# Patient Record
Sex: FEMALE | Race: WHITE
[De-identification: names, ages, dates, MRNs, and addresses within clinical notes are randomized per-mention and may not be internally consistent; named-entity substitution may affect disease eponyms.]

---

## 2020-03-30 ENCOUNTER — HOSPITAL ENCOUNTER (INPATIENT)
Dept: HOSPITAL 95 - ER | Age: 55
LOS: 2 days | Discharge: HOME | DRG: 897 | End: 2020-04-01
Attending: HOSPITALIST | Admitting: HOSPITALIST
Payer: COMMERCIAL

## 2020-03-30 VITALS — HEIGHT: 65.98 IN | BODY MASS INDEX: 30.51 KG/M2 | WEIGHT: 189.82 LBS

## 2020-03-30 DIAGNOSIS — E87.6: ICD-10-CM

## 2020-03-30 DIAGNOSIS — N17.9: ICD-10-CM

## 2020-03-30 DIAGNOSIS — F32.9: ICD-10-CM

## 2020-03-30 DIAGNOSIS — F10.239: Primary | ICD-10-CM

## 2020-03-30 DIAGNOSIS — E78.5: ICD-10-CM

## 2020-03-30 DIAGNOSIS — I10: ICD-10-CM

## 2020-03-30 DIAGNOSIS — E86.0: ICD-10-CM

## 2020-03-30 DIAGNOSIS — E87.2: ICD-10-CM

## 2020-03-30 DIAGNOSIS — Z87.891: ICD-10-CM

## 2020-03-30 LAB
ALBUMIN SERPL BCP-MCNC: 4 G/DL (ref 3.4–5)
ALBUMIN SERPL BCP-MCNC: 4.7 G/DL (ref 3.4–5)
ALBUMIN/GLOB SERPL: 1.1 {RATIO} (ref 0.8–1.8)
ALBUMIN/GLOB SERPL: 1.2 {RATIO} (ref 0.8–1.8)
ALT SERPL W P-5'-P-CCNC: 82 U/L (ref 12–78)
ALT SERPL W P-5'-P-CCNC: 91 U/L (ref 12–78)
ANION GAP SERPL CALCULATED.4IONS-SCNC: 20 MMOL/L (ref 6–16)
ANION GAP SERPL CALCULATED.4IONS-SCNC: 8 MMOL/L (ref 6–16)
APAP SERPL-MCNC: <2 UG/ML (ref 10–30)
AST SERPL W P-5'-P-CCNC: 131 U/L (ref 12–37)
AST SERPL W P-5'-P-CCNC: 158 U/L (ref 12–37)
BASOPHILS # BLD AUTO: 0.05 K/MM3 (ref 0–0.23)
BASOPHILS NFR BLD AUTO: 1 % (ref 0–2)
BENZODIAZ UR-MCNC: DETECTED UG/L
BILIRUB SERPL-MCNC: 0.7 MG/DL (ref 0.1–1)
BILIRUB SERPL-MCNC: 0.8 MG/DL (ref 0.1–1)
BILIRUB UR QL STRIP: (no result)
BUN SERPL-MCNC: 43 MG/DL (ref 8–24)
BUN SERPL-MCNC: 44 MG/DL (ref 8–24)
CALCIUM SERPL-MCNC: 10 MG/DL (ref 8.5–10.1)
CALCIUM SERPL-MCNC: 9.3 MG/DL (ref 8.5–10.1)
CHLORIDE SERPL-SCNC: 101 MMOL/L (ref 98–108)
CHLORIDE SERPL-SCNC: 98 MMOL/L (ref 98–108)
CO2 SERPL-SCNC: 19 MMOL/L (ref 21–32)
CO2 SERPL-SCNC: 27 MMOL/L (ref 21–32)
CREAT SERPL-MCNC: 1.05 MG/DL (ref 0.4–1)
CREAT SERPL-MCNC: 1.37 MG/DL (ref 0.4–1)
DEPRECATED RDW RBC AUTO: 51.6 FL (ref 35.1–46.3)
DEPRECATED RDW RBC AUTO: 53.2 FL (ref 35.1–46.3)
EOSINOPHIL # BLD AUTO: 0.06 K/MM3 (ref 0–0.68)
EOSINOPHIL NFR BLD AUTO: 1 % (ref 0–6)
ERYTHROCYTE [DISTWIDTH] IN BLOOD BY AUTOMATED COUNT: 13.2 % (ref 11.7–14.2)
ERYTHROCYTE [DISTWIDTH] IN BLOOD BY AUTOMATED COUNT: 13.5 % (ref 11.7–14.2)
ETHANOL SERPL-MCNC: <3 MG/DL
GLOBULIN SER CALC-MCNC: 3.5 G/DL (ref 2.2–4)
GLOBULIN SER CALC-MCNC: 4 G/DL (ref 2.2–4)
GLUCOSE SERPL-MCNC: 122 MG/DL (ref 70–99)
GLUCOSE SERPL-MCNC: 146 MG/DL (ref 70–99)
HCT VFR BLD AUTO: 32.8 % (ref 33–51)
HCT VFR BLD AUTO: 38.9 % (ref 33–51)
HGB BLD-MCNC: 11.1 G/DL (ref 11.5–16)
HGB BLD-MCNC: 12.8 G/DL (ref 11.5–16)
IMM GRANULOCYTES # BLD AUTO: 0.05 K/MM3 (ref 0–0.1)
IMM GRANULOCYTES NFR BLD AUTO: 1 % (ref 0–1)
KETONES UR STRIP-MCNC: (no result) MG/DL
LEUKOCYTE ESTERASE UR QL STRIP: (no result)
LYMPHOCYTES # BLD AUTO: 2.46 K/MM3 (ref 0.84–5.2)
LYMPHOCYTES NFR BLD AUTO: 28 % (ref 21–46)
MAGNESIUM SERPL-MCNC: 1.6 MG/DL (ref 1.6–2.4)
MCHC RBC AUTO-ENTMCNC: 32.9 G/DL (ref 31.5–36.5)
MCHC RBC AUTO-ENTMCNC: 33.8 G/DL (ref 31.5–36.5)
MCV RBC AUTO: 105 FL (ref 80–100)
MCV RBC AUTO: 108 FL (ref 80–100)
MONOCYTES # BLD AUTO: 0.81 K/MM3 (ref 0.16–1.47)
MONOCYTES NFR BLD AUTO: 9 % (ref 4–13)
NEUTROPHILS # BLD AUTO: 5.37 K/MM3 (ref 1.96–9.15)
NEUTROPHILS NFR BLD AUTO: 61 % (ref 41–73)
NRBC # BLD AUTO: 0 K/MM3 (ref 0–0.02)
NRBC # BLD AUTO: 0.02 K/MM3 (ref 0–0.02)
NRBC BLD AUTO-RTO: 0 /100 WBC (ref 0–0.2)
NRBC BLD AUTO-RTO: 0.2 /100 WBC (ref 0–0.2)
OSMOLALITY SERPL: 306 MOS/KG (ref 275–300)
PHOSPHATE SERPL-MCNC: 2.4 MG/DL (ref 2.5–4.9)
PLATELET # BLD AUTO: 118 K/MM3 (ref 150–400)
PLATELET # BLD AUTO: 172 K/MM3 (ref 150–400)
POTASSIUM SERPL-SCNC: 3.2 MMOL/L (ref 3.5–5.5)
POTASSIUM SERPL-SCNC: 3.7 MMOL/L (ref 3.5–5.5)
PROT SERPL-MCNC: 7.5 G/DL (ref 6.4–8.2)
PROT SERPL-MCNC: 8.7 G/DL (ref 6.4–8.2)
PROT UR STRIP-MCNC: (no result) MG/DL
RBC #/AREA URNS HPF: (no result) /HPF (ref 0–2)
SALICYLATES SERPL-MCNC: <1.7 MG/DL (ref 2.8–20)
SODIUM SERPL-SCNC: 136 MMOL/L (ref 136–145)
SODIUM SERPL-SCNC: 137 MMOL/L (ref 136–145)
SP GR SPEC: 1.02 (ref 1–1.02)
UROBILINOGEN UR STRIP-MCNC: (no result) MG/DL
WBC #/AREA URNS HPF: (no result) /HPF (ref 0–5)

## 2020-03-30 PROCEDURE — P9612 CATHETERIZE FOR URINE SPEC: HCPCS

## 2020-03-30 PROCEDURE — G0480 DRUG TEST DEF 1-7 CLASSES: HCPCS

## 2020-04-01 LAB
ANION GAP SERPL CALCULATED.4IONS-SCNC: 6 MMOL/L (ref 6–16)
BUN SERPL-MCNC: 27 MG/DL (ref 8–24)
CALCIUM SERPL-MCNC: 8.6 MG/DL (ref 8.5–10.1)
CHLORIDE SERPL-SCNC: 101 MMOL/L (ref 98–108)
CO2 SERPL-SCNC: 29 MMOL/L (ref 21–32)
CREAT SERPL-MCNC: 0.88 MG/DL (ref 0.4–1)
GLUCOSE SERPL-MCNC: 99 MG/DL (ref 70–99)
POTASSIUM SERPL-SCNC: 3.6 MMOL/L (ref 3.5–5.5)
SODIUM SERPL-SCNC: 136 MMOL/L (ref 136–145)

## 2021-09-13 ENCOUNTER — HOSPITAL ENCOUNTER (EMERGENCY)
Dept: HOSPITAL 95 - ER | Age: 56
Discharge: HOME | End: 2021-09-13
Payer: COMMERCIAL

## 2021-09-13 VITALS — WEIGHT: 163.01 LBS | BODY MASS INDEX: 26.2 KG/M2 | HEIGHT: 66 IN

## 2021-09-13 DIAGNOSIS — E83.42: ICD-10-CM
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
## 2021-09-13 DIAGNOSIS — F10.129: Primary | ICD-10-CM

## 2021-09-13 DIAGNOSIS — E87.6: ICD-10-CM

## 2021-09-13 DIAGNOSIS — D64.9: ICD-10-CM

## 2021-09-13 DIAGNOSIS — I10: ICD-10-CM

## 2021-09-13 DIAGNOSIS — Z79.899: ICD-10-CM

## 2021-09-13 LAB
ALBUMIN SERPL BCP-MCNC: 3.9 G/DL (ref 3.4–5)
ALBUMIN/GLOB SERPL: 1.1 {RATIO} (ref 0.8–1.8)
ALT SERPL W P-5'-P-CCNC: 119 U/L (ref 12–78)
ANION GAP SERPL CALCULATED.4IONS-SCNC: 9 MMOL/L (ref 6–16)
AST SERPL W P-5'-P-CCNC: 241 U/L (ref 12–37)
BASOPHILS # BLD AUTO: 0.03 K/MM3 (ref 0–0.23)
BASOPHILS NFR BLD AUTO: 1 % (ref 0–2)
BILIRUB SERPL-MCNC: 0.6 MG/DL (ref 0.1–1)
BUN SERPL-MCNC: 14 MG/DL (ref 8–24)
CALCIUM SERPL-MCNC: 9 MG/DL (ref 8.5–10.1)
CHLORIDE SERPL-SCNC: 102 MMOL/L (ref 98–108)
CO2 SERPL-SCNC: 28 MMOL/L (ref 21–32)
CREAT SERPL-MCNC: 0.66 MG/DL (ref 0.4–1)
DEPRECATED RDW RBC AUTO: 57.8 FL (ref 35.1–46.3)
EOSINOPHIL # BLD AUTO: 0.08 K/MM3 (ref 0–0.68)
EOSINOPHIL NFR BLD AUTO: 2 % (ref 0–6)
ERYTHROCYTE [DISTWIDTH] IN BLOOD BY AUTOMATED COUNT: 14.3 % (ref 11.7–14.2)
GLOBULIN SER CALC-MCNC: 3.7 G/DL (ref 2.2–4)
GLUCOSE SERPL-MCNC: 91 MG/DL (ref 70–99)
HCT VFR BLD AUTO: 28.9 % (ref 33–51)
HGB BLD-MCNC: 10.3 G/DL (ref 11.5–16)
IMM GRANULOCYTES # BLD AUTO: 0.02 K/MM3 (ref 0–0.1)
IMM GRANULOCYTES NFR BLD AUTO: 1 % (ref 0–1)
LYMPHOCYTES # BLD AUTO: 1.17 K/MM3 (ref 0.84–5.2)
LYMPHOCYTES NFR BLD AUTO: 32 % (ref 21–46)
MCHC RBC AUTO-ENTMCNC: 35.6 G/DL (ref 31.5–36.5)
MCV RBC AUTO: 108 FL (ref 80–100)
MONOCYTES # BLD AUTO: 0.49 K/MM3 (ref 0.16–1.47)
MONOCYTES NFR BLD AUTO: 14 % (ref 4–13)
NEUTROPHILS # BLD AUTO: 1.83 K/MM3 (ref 1.96–9.15)
NEUTROPHILS NFR BLD AUTO: 51 % (ref 41–73)
NRBC # BLD AUTO: 0 K/MM3 (ref 0–0.02)
NRBC BLD AUTO-RTO: 0 /100 WBC (ref 0–0.2)
PLATELET # BLD AUTO: 113 K/MM3 (ref 150–400)
POTASSIUM SERPL-SCNC: 3.4 MMOL/L (ref 3.5–5.5)
PROT SERPL-MCNC: 7.6 G/DL (ref 6.4–8.2)
SODIUM SERPL-SCNC: 139 MMOL/L (ref 136–145)

## 2021-09-13 PROCEDURE — A9270 NON-COVERED ITEM OR SERVICE: HCPCS

## 2021-09-13 PROCEDURE — G0480 DRUG TEST DEF 1-7 CLASSES: HCPCS

## 2021-12-20 ENCOUNTER — HOSPITAL ENCOUNTER (EMERGENCY)
Dept: HOSPITAL 95 - ER | Age: 56
Discharge: HOME | End: 2021-12-20
Payer: COMMERCIAL

## 2021-12-20 VITALS — HEIGHT: 66 IN | WEIGHT: 157.63 LBS | BODY MASS INDEX: 25.33 KG/M2

## 2021-12-20 DIAGNOSIS — F10.239: ICD-10-CM

## 2021-12-20 DIAGNOSIS — Z20.822: ICD-10-CM

## 2021-12-20 DIAGNOSIS — I10: ICD-10-CM

## 2021-12-20 DIAGNOSIS — F17.210: ICD-10-CM

## 2021-12-20 DIAGNOSIS — R07.2: Primary | ICD-10-CM

## 2021-12-20 DIAGNOSIS — Z79.899: ICD-10-CM

## 2021-12-20 LAB
ALBUMIN SERPL BCP-MCNC: 3.9 G/DL (ref 3.4–5)
ALBUMIN/GLOB SERPL: 1 {RATIO} (ref 0.8–1.8)
ALT SERPL W P-5'-P-CCNC: 99 U/L (ref 12–78)
ANION GAP SERPL CALCULATED.4IONS-SCNC: 11 MMOL/L (ref 6–16)
AST SERPL W P-5'-P-CCNC: 177 U/L (ref 12–37)
BASOPHILS # BLD AUTO: 0.06 K/MM3 (ref 0–0.23)
BASOPHILS NFR BLD AUTO: 1 % (ref 0–2)
BILIRUB SERPL-MCNC: 1.2 MG/DL (ref 0.1–1)
BUN SERPL-MCNC: 23 MG/DL (ref 8–24)
CALCIUM SERPL-MCNC: 10.5 MG/DL (ref 8.5–10.1)
CHLORIDE SERPL-SCNC: 99 MMOL/L (ref 98–108)
CO2 SERPL-SCNC: 24 MMOL/L (ref 21–32)
CREAT SERPL-MCNC: 0.66 MG/DL (ref 0.4–1)
DEPRECATED RDW RBC AUTO: 57.1 FL (ref 35.1–46.3)
EOSINOPHIL # BLD AUTO: 0.06 K/MM3 (ref 0–0.68)
EOSINOPHIL NFR BLD AUTO: 1 % (ref 0–6)
ERYTHROCYTE [DISTWIDTH] IN BLOOD BY AUTOMATED COUNT: 14.6 % (ref 11.7–14.2)
FLUAV RNA SPEC QL NAA+PROBE: NEGATIVE
FLUBV RNA SPEC QL NAA+PROBE: NEGATIVE
GLOBULIN SER CALC-MCNC: 3.9 G/DL (ref 2.2–4)
GLUCOSE SERPL-MCNC: 143 MG/DL (ref 70–99)
HCT VFR BLD AUTO: 32.2 % (ref 33–51)
HGB BLD-MCNC: 11.6 G/DL (ref 11.5–16)
IMM GRANULOCYTES # BLD AUTO: 0.02 K/MM3 (ref 0–0.1)
IMM GRANULOCYTES NFR BLD AUTO: 0 % (ref 0–1)
LYMPHOCYTES # BLD AUTO: 0.95 K/MM3 (ref 0.84–5.2)
LYMPHOCYTES NFR BLD AUTO: 18 % (ref 21–46)
MCHC RBC AUTO-ENTMCNC: 36 G/DL (ref 31.5–36.5)
MCV RBC AUTO: 105 FL (ref 80–100)
MONOCYTES # BLD AUTO: 0.42 K/MM3 (ref 0.16–1.47)
MONOCYTES NFR BLD AUTO: 8 % (ref 4–13)
NEUTROPHILS # BLD AUTO: 3.67 K/MM3 (ref 1.96–9.15)
NEUTROPHILS NFR BLD AUTO: 71 % (ref 41–73)
NRBC # BLD AUTO: 0 K/MM3 (ref 0–0.02)
NRBC BLD AUTO-RTO: 0 /100 WBC (ref 0–0.2)
PLATELET # BLD AUTO: 224 K/MM3 (ref 150–400)
POTASSIUM SERPL-SCNC: 4.3 MMOL/L (ref 3.5–5.5)
PROT SERPL-MCNC: 7.8 G/DL (ref 6.4–8.2)
RSV RNA SPEC QL NAA+PROBE: NEGATIVE
SARS-COV-2 RNA RESP QL NAA+PROBE: NEGATIVE
SODIUM SERPL-SCNC: 134 MMOL/L (ref 136–145)
TROPONIN I SERPL-MCNC: <0.015 NG/ML (ref 0–0.04)

## 2021-12-20 PROCEDURE — A9270 NON-COVERED ITEM OR SERVICE: HCPCS

## 2022-07-01 ENCOUNTER — HOSPITAL ENCOUNTER (INPATIENT)
Dept: HOSPITAL 95 - ER | Age: 57
LOS: 2 days | Discharge: ANOTHER HOSPITAL | DRG: 915 | End: 2022-07-03
Attending: INTERNAL MEDICINE | Admitting: INTERNAL MEDICINE
Payer: COMMERCIAL

## 2022-07-01 VITALS — HEIGHT: 65 IN | WEIGHT: 179.24 LBS | BODY MASS INDEX: 29.86 KG/M2

## 2022-07-01 DIAGNOSIS — N17.0: ICD-10-CM

## 2022-07-01 DIAGNOSIS — G40.509: ICD-10-CM

## 2022-07-01 DIAGNOSIS — N18.9: ICD-10-CM

## 2022-07-01 DIAGNOSIS — Z98.51: ICD-10-CM

## 2022-07-01 DIAGNOSIS — Z98.891: ICD-10-CM

## 2022-07-01 DIAGNOSIS — E87.2: ICD-10-CM

## 2022-07-01 DIAGNOSIS — T78.2XXA: ICD-10-CM

## 2022-07-01 DIAGNOSIS — Z79.899: ICD-10-CM

## 2022-07-01 DIAGNOSIS — D53.9: ICD-10-CM

## 2022-07-01 DIAGNOSIS — E78.5: ICD-10-CM

## 2022-07-01 DIAGNOSIS — G89.4: ICD-10-CM

## 2022-07-01 DIAGNOSIS — L68.0: ICD-10-CM

## 2022-07-01 DIAGNOSIS — T78.3XXA: Primary | ICD-10-CM

## 2022-07-01 DIAGNOSIS — I95.9: ICD-10-CM

## 2022-07-01 DIAGNOSIS — F10.239: ICD-10-CM

## 2022-07-01 DIAGNOSIS — J96.01: ICD-10-CM

## 2022-07-01 DIAGNOSIS — F19.10: ICD-10-CM

## 2022-07-01 DIAGNOSIS — G62.9: ICD-10-CM

## 2022-07-01 DIAGNOSIS — F32.A: ICD-10-CM

## 2022-07-01 DIAGNOSIS — E87.6: ICD-10-CM

## 2022-07-01 DIAGNOSIS — D63.1: ICD-10-CM

## 2022-07-01 DIAGNOSIS — Z90.710: ICD-10-CM

## 2022-07-01 DIAGNOSIS — E83.51: ICD-10-CM

## 2022-07-01 DIAGNOSIS — F41.9: ICD-10-CM

## 2022-07-01 DIAGNOSIS — J45.20: ICD-10-CM
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
## 2022-07-01 DIAGNOSIS — I12.9: ICD-10-CM

## 2022-07-01 DIAGNOSIS — Z20.822: ICD-10-CM

## 2022-07-01 DIAGNOSIS — E87.1: ICD-10-CM

## 2022-07-01 LAB
ALBUMIN SERPL BCP-MCNC: 2.8 G/DL (ref 3.4–5)
ALBUMIN SERPL BCP-MCNC: 3.3 G/DL (ref 3.4–5)
ALBUMIN/GLOB SERPL: 0.8 {RATIO} (ref 0.8–1.8)
ALBUMIN/GLOB SERPL: 0.8 {RATIO} (ref 0.8–1.8)
ALT SERPL W P-5'-P-CCNC: 80 U/L (ref 12–78)
ALT SERPL W P-5'-P-CCNC: 98 U/L (ref 12–78)
ANION GAP SERPL CALCULATED.4IONS-SCNC: 11 MMOL/L (ref 6–16)
ANION GAP SERPL CALCULATED.4IONS-SCNC: 12 MMOL/L (ref 6–16)
AST SERPL W P-5'-P-CCNC: 220 U/L (ref 12–37)
AST SERPL W P-5'-P-CCNC: 260 U/L (ref 12–37)
BASOPHILS # BLD: 0 K/MM3 (ref 0–0.23)
BASOPHILS NFR BLD: 0 % (ref 0–2)
BILIRUB SERPL-MCNC: 2.2 MG/DL (ref 0.1–1)
BILIRUB SERPL-MCNC: 2.8 MG/DL (ref 0.1–1)
BUN SERPL-MCNC: 18 MG/DL (ref 8–24)
BUN SERPL-MCNC: 19 MG/DL (ref 8–24)
CA-I BLD-SCNC: 1.03 MMOL/L (ref 1.1–1.46)
CALCIUM SERPL-MCNC: 7.5 MG/DL (ref 8.5–10.1)
CALCIUM SERPL-MCNC: 8.3 MG/DL (ref 8.5–10.1)
CHLORIDE BLD-SCNC: 98 MMOL/L (ref 98–108)
CHLORIDE SERPL-SCNC: 101 MMOL/L (ref 98–108)
CHLORIDE SERPL-SCNC: 102 MMOL/L (ref 98–108)
CO2 BLD-SCNC: 24 MMOL/L (ref 21–32)
CO2 SERPL-SCNC: 22 MMOL/L (ref 21–32)
CO2 SERPL-SCNC: 23 MMOL/L (ref 21–32)
CREAT BLD-MCNC: 1.4 MG/DL (ref 0.6–1)
CREAT SERPL-MCNC: 1.27 MG/DL (ref 0.4–1)
CREAT SERPL-MCNC: 1.29 MG/DL (ref 0.4–1)
DEPRECATED RDW RBC AUTO: 58.2 FL (ref 35.1–46.3)
EOSINOPHIL # BLD: 0 K/MM3 (ref 0–0.68)
EOSINOPHIL NFR BLD: 0 % (ref 0–6)
ERYTHROCYTE [DISTWIDTH] IN BLOOD BY AUTOMATED COUNT: 14.7 % (ref 11.7–14.2)
ETHANOL SERPL-MCNC: <3 MG/DL
GLOBULIN SER CALC-MCNC: 3.5 G/DL (ref 2.2–4)
GLOBULIN SER CALC-MCNC: 4.1 G/DL (ref 2.2–4)
GLUCOSE BLDC GLUCOMTR-MCNC: 176 MG/DL (ref 70–99)
GLUCOSE SERPL-MCNC: 190 MG/DL (ref 70–99)
GLUCOSE SERPL-MCNC: 192 MG/DL (ref 70–99)
HCT VFR BLD AUTO: 22.5 % (ref 33–51)
HCT VFR BLD AUTO: 27.6 % (ref 33–51)
HCT VFR BLD CALC: 36 % (ref 36–46)
HGB BLD CALC-MCNC: 12.2 G/DL (ref 12–16)
HGB BLD-MCNC: 7.9 G/DL (ref 11.5–16)
HGB BLD-MCNC: 9.3 G/DL (ref 11.5–16)
LYMPHOCYTES # BLD: 0.39 K/MM3 (ref 0.84–5.2)
LYMPHOCYTES NFR BLD: 8 % (ref 21–46)
MCHC RBC AUTO-ENTMCNC: 33.7 G/DL (ref 31.5–36.5)
MCV RBC AUTO: 109 FL (ref 80–100)
MONOCYTES # BLD: 0.04 K/MM3 (ref 0.16–1.47)
MONOCYTES NFR BLD: 1 % (ref 4–13)
NEUTS BAND NFR BLD MANUAL: 2 % (ref 0–8)
NEUTS SEG # BLD MANUAL: 4.47 K/MM3 (ref 1.96–9.15)
NEUTS SEG NFR BLD MANUAL: 89 % (ref 41–73)
NRBC # BLD AUTO: 0 K/MM3 (ref 0–0.02)
NRBC BLD AUTO-RTO: 0 /100 WBC (ref 0–0.2)
PH BLDV: 7.34 [PH] (ref 7.34–7.37)
PLATELET # BLD AUTO: 83 K/MM3 (ref 150–400)
POTASSIUM BLD-SCNC: 4.2 MMOL/L (ref 3.5–5.5)
POTASSIUM SERPL-SCNC: 4.1 MMOL/L (ref 3.5–5.5)
POTASSIUM SERPL-SCNC: 4.1 MMOL/L (ref 3.5–5.5)
PROT SERPL-MCNC: 6.3 G/DL (ref 6.4–8.2)
PROT SERPL-MCNC: 7.4 G/DL (ref 6.4–8.2)
SODIUM BLD-SCNC: 135 MMOL/L (ref 135–148)
SODIUM SERPL-SCNC: 135 MMOL/L (ref 136–145)
SODIUM SERPL-SCNC: 136 MMOL/L (ref 136–145)
TOTAL CELLS COUNTED BLD: 100

## 2022-07-01 PROCEDURE — C1769 GUIDE WIRE: HCPCS

## 2022-07-01 PROCEDURE — 0BH17EZ INSERTION OF ENDOTRACHEAL AIRWAY INTO TRACHEA, VIA NATURAL OR ARTIFICIAL OPENING: ICD-10-PCS | Performed by: EMERGENCY MEDICINE

## 2022-07-01 PROCEDURE — P9016 RBC LEUKOCYTES REDUCED: HCPCS

## 2022-07-01 PROCEDURE — C1751 CATH, INF, PER/CENT/MIDLINE: HCPCS

## 2022-07-01 PROCEDURE — U0004 COV-19 TEST NON-CDC HGH THRU: HCPCS

## 2022-07-01 PROCEDURE — 30233K1 TRANSFUSION OF NONAUTOLOGOUS FROZEN PLASMA INTO PERIPHERAL VEIN, PERCUTANEOUS APPROACH: ICD-10-PCS | Performed by: INTERNAL MEDICINE

## 2022-07-01 PROCEDURE — 30233N1 TRANSFUSION OF NONAUTOLOGOUS RED BLOOD CELLS INTO PERIPHERAL VEIN, PERCUTANEOUS APPROACH: ICD-10-PCS | Performed by: INTERNAL MEDICINE

## 2022-07-01 PROCEDURE — P9059 PLASMA, FRZ BETWEEN 8-24HOUR: HCPCS

## 2022-07-01 PROCEDURE — P9035 PLATELET PHERES LEUKOREDUCED: HCPCS

## 2022-07-01 PROCEDURE — 30233R1 TRANSFUSION OF NONAUTOLOGOUS PLATELETS INTO PERIPHERAL VEIN, PERCUTANEOUS APPROACH: ICD-10-PCS | Performed by: INTERNAL MEDICINE

## 2022-07-01 PROCEDURE — 3E033XZ INTRODUCTION OF VASOPRESSOR INTO PERIPHERAL VEIN, PERCUTANEOUS APPROACH: ICD-10-PCS | Performed by: INTERNAL MEDICINE

## 2022-07-01 PROCEDURE — G0480 DRUG TEST DEF 1-7 CLASSES: HCPCS

## 2022-07-01 PROCEDURE — C9113 INJ PANTOPRAZOLE SODIUM, VIA: HCPCS

## 2022-07-01 NOTE — NUR
Spiritual Care - Spouse
While Pt. was being treated in the ED. Sat with spouse in consult room.
Facilitated a family life review and established rapport.  Prayed with spouse.
Spouse is a man of pancho.  Stayed with spouse till Dr. Kamara (ED Doc) gave the
update. Pt. will be moved to ICU. Spouse will wait in ICU lounge.

## 2022-07-01 NOTE — NUR
SUMMARY- RECIEVED PT FROM ER AT 1650
PT VENTED % FiO2 AND TITRATED DOWN TO 50%. 60MCG OF PROPOFOL, 10 OF
NOREPINEPHRINE, VERSED DRIP INCREASED TO 4MG/HR. GOAL TO HAVE PT HEAVILY
SEDATED WITH NO MOVEMENT DUE TO DIFFICULT AIRWAY.
PT STILL HAVING NON
PURPOSEFUL MOVEMENTS. PUPILS EQUAL AND REACTIVE, PULSES FAINT. SWELLING AROUND
FACE AND EYES, TONGUE IS SPLIT DOWN THE MIDDLE FROM UNERSIDE, CONTINUALLY
OOZING BLOOD.
CRIKE SITE HAD
STITCHES REMOMED FROM OUTSIDE DUE TO SUSPECTED AIR LEAK FROM INSIDE OF
INCISION CAUSING CREPITUS.
SITE IS OOZING BLOOD, ICE PACKS APPLIED TO THE SIDES OF INCISION
WITH A FLUFF OVER INCISION. CREPITUS FELT UP TO CHEEK BONES, DOWN TO THIRD
INTERCOSTAL SPACE. SIEGEL IN PLACE, NO UA SENT AT THIS TIME DUE TO INADEQUATE
SPECIMEN.

## 2022-07-02 LAB
ALBUMIN SERPL BCP-MCNC: 2.3 G/DL (ref 3.4–5)
ALBUMIN SERPL BCP-MCNC: 3 G/DL (ref 3.4–5)
ALBUMIN/GLOB SERPL: 0.9 {RATIO} (ref 0.8–1.8)
ALBUMIN/GLOB SERPL: 0.9 {RATIO} (ref 0.8–1.8)
ALT SERPL W P-5'-P-CCNC: 70 U/L (ref 12–78)
ALT SERPL W P-5'-P-CCNC: 75 U/L (ref 12–78)
ANION GAP SERPL CALCULATED.4IONS-SCNC: 12 MMOL/L (ref 6–16)
ANION GAP SERPL CALCULATED.4IONS-SCNC: 17 MMOL/L (ref 6–16)
AST SERPL W P-5'-P-CCNC: 165 U/L (ref 12–37)
AST SERPL W P-5'-P-CCNC: 174 U/L (ref 12–37)
BASOPHILS # BLD AUTO: 0 K/MM3 (ref 0–0.23)
BASOPHILS NFR BLD AUTO: 0 % (ref 0–2)
BILIRUB SERPL-MCNC: 2.5 MG/DL (ref 0.1–1)
BILIRUB SERPL-MCNC: 2.6 MG/DL (ref 0.1–1)
BUN SERPL-MCNC: 25 MG/DL (ref 8–24)
BUN SERPL-MCNC: 28 MG/DL (ref 8–24)
CALCIUM SERPL-MCNC: 8.1 MG/DL (ref 8.5–10.1)
CALCIUM SERPL-MCNC: 8.5 MG/DL (ref 8.5–10.1)
CHLORIDE SERPL-SCNC: 97 MMOL/L (ref 98–108)
CHLORIDE SERPL-SCNC: 98 MMOL/L (ref 98–108)
CO2 SERPL-SCNC: 16 MMOL/L (ref 21–32)
CO2 SERPL-SCNC: 25 MMOL/L (ref 21–32)
CREAT SERPL-MCNC: 1.54 MG/DL (ref 0.4–1)
CREAT SERPL-MCNC: 2.17 MG/DL (ref 0.4–1)
DEPRECATED RDW RBC AUTO: 57.7 FL (ref 35.1–46.3)
EOSINOPHIL # BLD AUTO: 0 K/MM3 (ref 0–0.68)
EOSINOPHIL NFR BLD AUTO: 0 % (ref 0–6)
ERYTHROCYTE [DISTWIDTH] IN BLOOD BY AUTOMATED COUNT: 14.8 % (ref 11.7–14.2)
GLOBULIN SER CALC-MCNC: 2.7 G/DL (ref 2.2–4)
GLOBULIN SER CALC-MCNC: 3.3 G/DL (ref 2.2–4)
GLUCOSE SERPL-MCNC: 158 MG/DL (ref 70–99)
GLUCOSE SERPL-MCNC: 272 MG/DL (ref 70–99)
HCT VFR BLD AUTO: 17.8 % (ref 33–51)
HCT VFR BLD AUTO: 21.5 % (ref 33–51)
HCT VFR BLD AUTO: 21.7 % (ref 33–51)
HCT VFR BLD AUTO: 22.5 % (ref 33–51)
HCT VFR BLD AUTO: 23.9 % (ref 33–51)
HGB BLD-MCNC: 6.1 G/DL (ref 11.5–16)
HGB BLD-MCNC: 7.5 G/DL (ref 11.5–16)
HGB BLD-MCNC: 8.1 G/DL (ref 11.5–16)
IMM GRANULOCYTES # BLD AUTO: 0.02 K/MM3 (ref 0–0.1)
IMM GRANULOCYTES NFR BLD AUTO: 0 % (ref 0–1)
LEUKOCYTE ESTERASE UR QL STRIP: (no result)
LYMPHOCYTES # BLD AUTO: 0.45 K/MM3 (ref 0.84–5.2)
LYMPHOCYTES NFR BLD AUTO: 6 % (ref 21–46)
MAGNESIUM SERPL-MCNC: 0.8 MG/DL (ref 1.6–2.4)
MCHC RBC AUTO-ENTMCNC: 34.9 G/DL (ref 31.5–36.5)
MCV RBC AUTO: 107 FL (ref 80–100)
MONOCYTES # BLD AUTO: 0.29 K/MM3 (ref 0.16–1.47)
MONOCYTES NFR BLD AUTO: 4 % (ref 4–13)
NEUTROPHILS # BLD AUTO: 6.52 K/MM3 (ref 1.96–9.15)
NEUTROPHILS NFR BLD AUTO: 90 % (ref 41–73)
NRBC # BLD AUTO: 0 K/MM3 (ref 0–0.02)
NRBC BLD AUTO-RTO: 0 /100 WBC (ref 0–0.2)
PH BLDA: 7.2 [PH] (ref 7.35–7.45)
PH BLDA: 7.29 [PH] (ref 7.35–7.45)
PLATELET # BLD AUTO: 78 K/MM3 (ref 150–400)
POTASSIUM SERPL-SCNC: 3.5 MMOL/L (ref 3.5–5.5)
POTASSIUM SERPL-SCNC: 3.8 MMOL/L (ref 3.5–5.5)
PROT SERPL-MCNC: 5 G/DL (ref 6.4–8.2)
PROT SERPL-MCNC: 6.3 G/DL (ref 6.4–8.2)
PROT UR STRIP-MCNC: (no result) MG/DL
RBC #/AREA URNS HPF: (no result) /HPF (ref 0–2)
SODIUM SERPL-SCNC: 130 MMOL/L (ref 136–145)
SODIUM SERPL-SCNC: 135 MMOL/L (ref 136–145)
SP GR SPEC: 1.01 (ref 1–1.02)
URN SPEC COLLECT METH UR: (no result)
UROBILINOGEN UR STRIP-MCNC: (no result) MG/DL
WBC #/AREA URNS HPF: (no result) /HPF (ref 0–5)

## 2022-07-02 PROCEDURE — 5A1945Z RESPIRATORY VENTILATION, 24-96 CONSECUTIVE HOURS: ICD-10-PCS | Performed by: INTERNAL MEDICINE

## 2022-07-02 PROCEDURE — 03HY32Z INSERTION OF MONITORING DEVICE INTO UPPER ARTERY, PERCUTANEOUS APPROACH: ICD-10-PCS | Performed by: INTERNAL MEDICINE

## 2022-07-02 NOTE — NUR
DR. LANZA AT BEDSIDE. RIGHT FEMORAL A LINE PLACE-ZEROED AND FLUSHED. STAT ABG,
CMP, MG, AND H&H SENT.

## 2022-07-02 NOTE — NUR
MAP STILL TRENDING IN THE 50'S. LEVOPHED DRIP TITRATED UP TO 30 MCG/MIN. DR. LANZA AWARE. PROPOFOL DECREASED TO 20 MCG/KG/MIN AND VERSED DECREASED TO 2
MG/HR. DR. LANZA UPDATED-H&H HAS BEEN ORDERED.

## 2022-07-02 NOTE — NUR
PT APPEARS RESTLESS. MOVING HER HEAD ABOUT THE PILLOW, PULLING ON RESTRAINTS,
AND GRIMACING. MED WITH FENTANYL 50 MCG IVP X 1 AND INCREASED VERSED DRIP TO 5
MG/HR. MAP DROPPED TO 63 AFTER MED FOR PAIN/SEDATION ADJUNCT.LEVOPHED DRIP
TITRATED UP TO 3 MCG/MIN. LUNGS COARSE TO UPPER LOBES-ETT SUCTION PRODUCTIVE
OF SCANT AMOUNT OF THICK, BLOODY SPUTUM. SIEGEL CONTINUES WITH SCANT, EMELY
URINE WITH LOTS OF SEDIMENT. MULTIPLE FAMILY MEMBERS @ BEDSIDE-UPDATED TO PLAN
OF CARE. FAMILY WISHES TO SPEAK WITH DR. LANZA WHEN HE DOES ROUNDS.

## 2022-07-02 NOTE — NUR
CRITICAL MAGNESIUM LEVEL 0.6 REPORTED TO DR. LANZA. MG+ REPLACEMENT ORDERED.
REPORT GIVEN TO SELENA SANDOVAL.

## 2022-07-02 NOTE — NUR
PT REMAINS SEDATED, INTUBATED, AND RESTRAINED. PROPOFOL WAS OFF BRIEFLY-APROX
10 MINUTES EARLIER THIS AFTERNOON WHEN PT HYPOTENSIVE. AT THAT TIME, PT OPENED
HER EYES, BUT DID NOT TRACK OR FOLLOW COMMANDS. NO FURTHER SEIZURE ACTIVITY
NOTED. PROPOFOL @ 20 MCG/KG/MIN AND VERSED @ 2 MG/HR. HR TRENDING 'S.
LEVOPHED @ 30 MCG/MIN, VASOPRESSIN @ 0.04 UNITS-MAP TRENDING 60. 2ND UNIT OF
PRBC'S COMPLETE. LUNGS COARSE TO UPPER LOBES. NO CHANGE IN APPEARANCE OF
CREPITUS. TONGUE REMAINS SWOLLEN AND IS PROTRUDING FROM THE MOUTH, BUT NO
CHANGE FROM PREVIOUS ASSESSMENT. ETT TO VENT: AC 14 DECREASED @ APROX 1430, RR
14-18, , PEEP 5, FIO2 30%-SATS>90% RECTAL TUBE WITH SMALL AMOUNT OF
BROWN, LIQUID STOOL. SIEGEL OUTPUT REMAINS POOR-DR. LANZA AWARE.

## 2022-07-02 NOTE — NUR
DR. LANZA IN TO SEE PT. STAT PORTABLE CXR ORDERED. TOLERATED WELL. DR. LANZA
GAVE THE OK TO BATHE PT, CHANGE LINEN, AND ETT SECUREMENT DEVICE. DURING THAT
TIME PT GRIMACING AND COUGHING. PROPOFOL TITRATED UP TO 75 MCG/KG/MIN AND
VERSED @ 5 MG/HR. PT INCONTINENT OF LARGE AMOUNT OF BROWN, LIQUID STOOL. WHEN
CLEANING PT UP AND CHANGING LINEN, PT NOTED TO HAVE SEIZURE LIKE ACTIVITY. DR. LANZA MADE AWARE-LORAZEPAM 4 MG IVP X 1 GIVEN-SEE EMAR. NO FURTHER SEIZURE LIKE
ACTIVITY NOTED. RECTAL TUBE PLACED AS PT HAD ADDITIONAL, MODERATE, BROWN,
LIQUID STOOL IMMEDIATELY AFTER DRY RADHA CHANGED. MAP TRENDING 40'S WITH
LEVOPHED @ 3 MCG/MIN-TITRATED UP TO 20 MCG/MIN.

## 2022-07-02 NOTE — NUR
MAP STILL TRENDING 50'S. EPINEPHRINE DRIP INITIATED @ 5MCG/MIN. CALCIUM
GLUCONATE 2 GRAMS INFUSING PER DR. LANZA ORDERS.

## 2022-07-02 NOTE — NUR
SHIFT ASSESSMENT
 
ASSUMED CARE OF PT @ 1900, BEDSIDE REPORT RECEIVED FROM SELENA KENNEDY. PT
INTUBATED AND SEDATED. VENT SETTINGS-14/450/30%/5 c O2 SATS >90%. PROPOFOL GTT
INFUSING @ 20MCG/KG/MIN, VERSED @ 2MG/HR, LEVOPHED @ 16MCG/MIN, EPINEPHRINE @
10MCG/MIN, AND VASOPRESSIN @ 0.04U/MIN. SINUS TACH ON THE CARDIAC MONITOR. LS
COARSE T/O, MODERATE AMNT OF BLOODY SECRETIONS SUCTIONED VIA ETT. PTS TONGUE
REMAINS SWOLLEN, PROTRUDING FROM MOUTH, SMALL AMNT OF BLOOD SUCTIONED VIA ORAL
CAVITY. CREPITUS REMAINS IN UPPER CHEST. ART LINE SITE WNL, ZEROED ART LINE.
TEMP PROBE SIEGEL PATENT, SCANT URINE OUTPUT. RECTAL TUBE IN PLACE, SMALL AMNT
OF LOOSE BROWN STOOL DRAINING.

## 2022-07-02 NOTE — NUR
MAP STILL TRENDING 50'S. 1ST UNIT PRBC'S INFUSING WITH BLOOD WARMER AND @ 500
CC/HR. LR BOLUS #2 INFUSING PER DR. LANZA. DR. LANZA HAS UPDATED FAMILY MULTIPLE
TIMES THIS AFTERNOON.

## 2022-07-02 NOTE — NUR
PT REMAINS INTUBATED, SEDATED, AND RESTRAINED. GRIMACES AND COUGHS WITH
NOXIOUS STIMULI, BUT CURRENTLY NOT FOLLOWING COMMANDS. PROPOFOL @ 60
MCG/KG/MIN AND VERSED @ 4 MG/HR. ECG SHOWS SR WITH RATE 80'S. MAP 50'S.
LEVOPHED DRIP INITIATED @ 5 MCG/MIN. STAT H&H SENT TITRATING TO MAP 60-65.
CRICOID WOUND GENTLY CLEASED WITH SOAP AND WATER AND NONADHERENT DRESSING
APPLIED. CREPITUS CONTINUES-UP INTO THE NECK AND CHEEKS. THE CREPITUS HAS
RECEDED SOMEWHAT FROM THE DEMARCATED AREA ON THE UPPER CHEST. PT TONGUE IS
SWOLLEN AND PROTRUDING FROM THE MOUTH. THERE IS AN ABRASION NOTED TO THE RIGHT
SIDE OF THE TONGUE.  ORAL CARE PRODUCTIVE OF A MODERATE AMOUNT OF JORDAN, RED,
BLOOD. ETT 7.0/26@LIP. RT MANAGING AIRWAY-PILLOW SLIP CHANGED AS SOILED WITH
BLOOD. PT BOOSTED IN BED, AND REPOSITONED TO (SLIGHT) RIGHT SIDE. PT TOLERATED
WELL.  LUNGS DIMINISHED TO THE BASES. ETT TO VENT: AC 18, , PEEP 5, FIO2
30%-SATS>90%. ETT SUCTION PRODUCTIVE OF SMALL AMOUNT OF BLOODY SECRETIONS. PT
NPO. NO OGT. BT'S AUSCULTATED X 4. SIEGEL TO BEDSIDE DRAINAGE WITH 15 CC EMELY
URINE WITH SEDIMENT. SKIN WITH SCATTERED BRUISING TO UPPER EXTREMITIES. NO
NOTED SKIN BREAKDOWN.

## 2022-07-02 NOTE — NUR
PT. RINGS WERE SENT HOME LAST NIGHT WITH HER SON KALANI JARAMILLO. PT. HAD NO
OTHER BELONGINGS SEEN IN THE ROOM

## 2022-07-02 NOTE — NUR
PT. IS VENTILATED AND IS STILL SATTING WELL ON 30% O2.
BP CAME UP AND I WAS ABLE TO TITRATE OFF THE LEVO AND PRESSURES REMAIN WNL.
CRIKE SITE HAS STOPPED BLEEDING AFTER SATURATING 2 DRESSINGS, AND SWELLING
AROUND THE SITE HAS GONE DOWN AS WELL AS THE SUBQ EMPHASEMA AROUND THE CHEST
AND LOWER FACE AS WELL AS THE SWELLING IN THE TONGUE.
PT. HAS HAD VERY LITTLE URINE OUTPUT, 150 OVERNIGHT, MD AWARE.
PT. IS STILL SEDATED ON PROPOFOL AND VERSED AND SEEMS TO BE RESTING
COMFORTABLY AT THIS TIME.

## 2022-07-02 NOTE — NUR
MAP STILL TRENDING 50'S-VASOPRESSIN DRIP INITIATED @ 0.04 UNITS/MIN. LR 1
LITER BOLUS INFUSING. URINE OUTPUT STILL POOR-U/A SENT. TEMP 99.2. HR 'S
ST.

## 2022-07-03 LAB
ALBUMIN SERPL BCP-MCNC: 1.9 G/DL (ref 3.4–5)
ALBUMIN SERPL BCP-MCNC: 2.2 G/DL (ref 3.4–5)
ANION GAP SERPL CALCULATED.4IONS-SCNC: 17 MMOL/L (ref 6–16)
ANION GAP SERPL CALCULATED.4IONS-SCNC: 19 MMOL/L (ref 6–16)
ANION GAP SERPL CALCULATED.4IONS-SCNC: 21 MMOL/L (ref 6–16)
BASOPHILS # BLD AUTO: 0.02 K/MM3 (ref 0–0.23)
BASOPHILS NFR BLD AUTO: 0 % (ref 0–2)
BUN SERPL-MCNC: 29 MG/DL (ref 8–24)
BUN SERPL-MCNC: 30 MG/DL (ref 8–24)
BUN SERPL-MCNC: 30 MG/DL (ref 8–24)
CALCIUM SERPL-MCNC: 6.9 MG/DL (ref 8.5–10.1)
CALCIUM SERPL-MCNC: 7.3 MG/DL (ref 8.5–10.1)
CALCIUM SERPL-MCNC: 7.7 MG/DL (ref 8.5–10.1)
CHLORIDE SERPL-SCNC: 89 MMOL/L (ref 98–108)
CHLORIDE SERPL-SCNC: 89 MMOL/L (ref 98–108)
CHLORIDE SERPL-SCNC: 90 MMOL/L (ref 98–108)
CO2 SERPL-SCNC: 17 MMOL/L (ref 21–32)
CO2 SERPL-SCNC: 19 MMOL/L (ref 21–32)
CO2 SERPL-SCNC: 20 MMOL/L (ref 21–32)
CREAT SERPL-MCNC: 2.53 MG/DL (ref 0.4–1)
CREAT SERPL-MCNC: 2.64 MG/DL (ref 0.4–1)
CREAT SERPL-MCNC: 2.82 MG/DL (ref 0.4–1)
DEPRECATED RDW RBC AUTO: 62.2 FL (ref 35.1–46.3)
EOSINOPHIL # BLD AUTO: 0 K/MM3 (ref 0–0.68)
EOSINOPHIL NFR BLD AUTO: 0 % (ref 0–6)
ERYTHROCYTE [DISTWIDTH] IN BLOOD BY AUTOMATED COUNT: 18.7 % (ref 11.7–14.2)
GLUCOSE SERPL-MCNC: 210 MG/DL (ref 70–99)
GLUCOSE SERPL-MCNC: 233 MG/DL (ref 70–99)
GLUCOSE SERPL-MCNC: 256 MG/DL (ref 70–99)
HCT VFR BLD AUTO: 19.8 % (ref 33–51)
HCT VFR BLD AUTO: 22.9 % (ref 33–51)
HGB BLD-MCNC: 6.9 G/DL (ref 11.5–16)
HGB BLD-MCNC: 8 G/DL (ref 11.5–16)
IMM GRANULOCYTES # BLD AUTO: 0.15 K/MM3 (ref 0–0.1)
IMM GRANULOCYTES NFR BLD AUTO: 1 % (ref 0–1)
LYMPHOCYTES # BLD AUTO: 1.34 K/MM3 (ref 0.84–5.2)
LYMPHOCYTES NFR BLD AUTO: 9 % (ref 21–46)
MAGNESIUM SERPL-MCNC: 1.8 MG/DL (ref 1.6–2.4)
MAGNESIUM SERPL-MCNC: 1.8 MG/DL (ref 1.6–2.4)
MAGNESIUM SERPL-MCNC: 1.9 MG/DL (ref 1.6–2.4)
MCHC RBC AUTO-ENTMCNC: 34.9 G/DL (ref 31.5–36.5)
MCV RBC AUTO: 93 FL (ref 80–100)
MONOCYTES # BLD AUTO: 0.93 K/MM3 (ref 0.16–1.47)
MONOCYTES NFR BLD AUTO: 6 % (ref 4–13)
NEUTROPHILS # BLD AUTO: 12.76 K/MM3 (ref 1.96–9.15)
NEUTROPHILS NFR BLD AUTO: 84 % (ref 41–73)
NRBC # BLD AUTO: 0.04 K/MM3 (ref 0–0.02)
NRBC BLD AUTO-RTO: 0.3 /100 WBC (ref 0–0.2)
PH BLDA: 7.42 [PH] (ref 7.35–7.45)
PHOSPHATE SERPL-MCNC: 5.6 MG/DL (ref 2.5–4.9)
PHOSPHATE SERPL-MCNC: 6 MG/DL (ref 2.5–4.9)
PHOSPHATE SERPL-MCNC: 7.5 MG/DL (ref 2.5–4.9)
PLATELET # BLD AUTO: 143 K/MM3 (ref 150–400)
POTASSIUM SERPL-SCNC: 3.2 MMOL/L (ref 3.5–5.5)
POTASSIUM SERPL-SCNC: 3.9 MMOL/L (ref 3.5–5.5)
POTASSIUM SERPL-SCNC: 4.1 MMOL/L (ref 3.5–5.5)
SARS-COV-2 RNA RESP QL NAA+PROBE: NEGATIVE
SODIUM SERPL-SCNC: 127 MMOL/L (ref 136–145)

## 2022-07-03 NOTE — NUR
PT REMAINS INTUBATED, SEDATED, AND RESTRAINED. PROPOFOL @ 25 MCG/KG/MIN,
VERSED @ 5 MG/HR, AND KETAMINE @ 0.4 MG/KG/HR. TEMP 101.1. BC X1 SENT. HR
140'S ST. MAP TRENDING 50'S WITH LEVOPHED @ 20 MCG/MIN, VASOPRESSIN @ 0.04
UNITS/MIN, AND EPINEPHRINE @ 20 MCG/MIN. MULTIPLE ADDITIONAL UNITS OF BLOOD
PRODUCTS HAVE BEEN ORDERED-AWAITING BLOOD/READY SLIPS. LUNGS COARSE THROUGH
OUT. SATS>90% ON AC 14, RR 18, , PEEP 8, FIO2 30%. SUCTION PRODUCTIVE OF
MINIMAL, BLOODY SECRETIONS. PT HAS 3+ GENERALIZED EDEMA. URINE OUTPUT REMAINS
POOR. VERBAL ORDER GIVEN FROM DR. LANZA TO *NOT* REPOSITION/TURN PT UNLESS
AUTHORIZED BY HIM. DR. LANZA ATTEMPTING TO FIND BED PLACEMENT/HIGHER LEVEL OF
CARE.

## 2022-07-03 NOTE — NUR
MESSAGE LEFT WITH DR. MALDONADO ANSWERING SERVICE-PER DR. LANZA REQUEST. RN @ BEDSIDE
HOLDING GENTLY PRESSURE TO RIGHT NECK-PER DR. LANZA VERBAL ORDER.

## 2022-07-03 NOTE — NUR
DR. LANZA AT BEDSIDE. PREP TO TRANSPORT PT TO CT. EPI 1 MG PULLED FOR POSSIBLE
EMERGENCY IN CT SCAN AND DEFIB PADS PLACED. DR. SOLITARIO AT BEDSIDE
BRIEFLY-UPDATE GIVEN AND HE SPOKE WITH DR. LANZA AS WELL. RP AND MG+ ORDERED
FOR 1200-WILL CALL RESULTS TO [facility]. FIO2 TITRATED UP % AND LEVOPHED
TITRATED UP TO 20 MCG/MIN PER VERBAL ORDER DR. LANZA.

## 2022-07-03 NOTE — NUR
DR. LANZA IN TO SEE PT. FULL UPDATE GIVEN. 1 ST UNIT OF PRBC'S INITIATED. EPT
TITRATED UP TO 25 MCG/MIN PER V.O. DR. LANZA.

## 2022-07-03 NOTE — NUR
SHIFT SUMMARY
 
PT REMAINS INTUBATED AND SEDATED. PROPOFOL NOW @ 30MCG/KG/MIN, VERSED @
3MG/HR, LEVOPHED @ 1MCG/MIN, EPINEPHRINE @ 10MCG/MIN AND VASOPRESSIN IS OFF.
PT GIVEN 1 UNIT PRBC AND 1 UNIT PLATELETS. 50 MEQ PUSH OF NaHCO3 GIVEN AND
150MEQ GTT INITIATED @ 100ML/HR.  AROUND 0300 PT SLOWLY BECAME MORE ALERT,
ABLE TO OPEN EYES SPONTANEOUSLY, SQUEEZING BOTH HANDS AND WIGGLING TOES ON
COMMAND. PT APPEARED VERY UNCOMFORTABLE HENCE INCREASED SEDATION ALONG WITH
50MCG FENTANYL GIVEN.  ANGIOEDEMA IMPROVING. CREPITUS IN UPPER CHEST
DIMINISHED. NO OBVIOUS SIGNS OF BLEEDING OTHER THAN SMALL AMNT FROM TONGUE.
ETT SECRETIONS DIMINISHED T/O NIGHT. URINE OUTPUT REMAINS DECREASED, 150ML OUT
THIS SHIFT.

## 2022-07-03 NOTE — NUR
SATS DROPPED TO 50'S FOR NO APPERENT REASON. RT SUMMONED TO BEDSIDE. PT BAGGED
X SEVERAL MINUTES UNTIL SATS RETURNED TO THE 90'S-THEN PLACE BACK ON VENT WITH
PREVIOUS SETTINGS AND FIO2 50%. MAP TRENDING 40'S. EPI TITRATED UP TO 20
MCG/MIN, LEVOPHED TITRATED UPT TO 15 MCG/MIN, AND VASOPRESSIN @ 0.04
UNITS/MIN. STAT ABG, H&H, RP, MG+, AND LACTIC ACID SENT. TEMP 100.0.

## 2022-07-03 NOTE — NUR
AT APROX 1315 MAP TRENDING 40'S. EPI TITRATED UP TO 30 MCG/MIN, LEVOPHED
TITRATED UP TO 20 MCG/MIN, AND VASOPRESSIN CONTINUES @ 0.04 UNITS/MIN. DR. LANZA SUMMONED TO BEDSIDE. NEOSYNEPHRINE 100 MCG PUSH X 1 GIVEN.  MULTIPLE
UNITS OF BLOOD PRODUCTS ORDERED. A TOTAL OF 4 PRBC'S, 2 PLT, AND 1 FFP HAVE
BEEN GIVEN TODAY. AFTER BLOOD PRODUCTS, -130'S ST. MAP TRENDING 70'S
WITH EPINEPHRINE @ 20 MCG/MIN, LEVOPHED @ 10 MCG/MIN, AND VASOPRESSIN @ 0.04
UNITS/MIN. AT 1445 DR. LANZA SUMMONED TO BEDSIDE-PT IS RESLESS AND MORE
DIFFICULT TO VENTILATE. PROPOL DRIP RESUMED @ 30 MCG/KG/MIN, VERSED CONTINUES
@ 5 MG/HR, AND KETAMINE CONTINUES @ 0.5 MG/KG/MIN-ROCURONIUM 80 MG IVP X 1
GIVEN PER DR. LANZA ORDER. PRESSURE IS TO BE CONTINUOUSLY HELD TO RIGHT
NECK/CAROTID AREA. SIEGEL OUTPUT STILL VERY POOR. REPORT CALLED TO [place] RN @
[facility]. THIS RN TO [facility] CREW TO APPLY PRESSURE TO
RIGHT NECK.

## 2022-07-03 NOTE — NUR
PT REMAINS INTUBATED, SEDATED, AND RESTRAINED. PT OPENS EYES TO VERBAL
STIMULI, AND FOLLOWED COMMAND TO "SQUEEZE" BOTH HANDS. PT BECAME AGITATED AND
STARTED TO GRIMACE, COUGH, AND PULL ON RESTRAINTS. PROPOFOL DRIP INCREASED TO
35 MCG/KG/MIN. VERSED DRIP @ 3 MG/HR-MED WITH FENTANYL PER CPOT/SEDATION
ADJUNCT. TEMP 99.8/ ECG SHOWS ST WITH RATE 100-110'S. PT HAS 3+ GENERALIZED
EDEMA. MAP TRENDING 48 WITH EPINEPHRINE DRIP @ 10MCG/MIN (NON STANDARD
CONCENTRATION), LEVOPHED @ 1MCG/MIN(QUAD STRENGTH)-LEVOPHED TITRATED UP TO 10
MCG/MIN AND MAP STILL 50'S-RESUMED VASOPRESSIN @ 0.04-TITRATING PRESSORS TO
MAP 60-65. LUNGS COARSE TO UPPER LOBES. ETT 7.0/26 @ TEETH. THE TONGUE REMAINS
SWOLLEN, BUT IMPROVED FROM 7/2/22 ASSESSMENT AND MINIMAL PROTRUSION FROM THE
MOUTH. THE NECK, FACE, AND UPPER CHEST CREPITUS CONTINES, BUT APPEARS IMPROVED
FROM YESTERDAY. CRICOID SITE CLEAR-NON ADHERENT DRESSING IS C/D/I. ETT TO
VENT: AC 14, RR 22, , PEEP 5, FIO2 35%-SATS>90%. ETT SUCTION PRODUCTIVE
OF SMALL AMOUNT OF THICK, OLD, BLOODY SPUTUM. STILL NO OGT. RECTAL TUBE INTACT
WITH SMALL AMOUNT OF BROWN, LIQUID STOOL. SIEGEL WITH SMALL AMOUNT OF DARK,
YELLOW URINE TO BSD. RIGHT HAND IS VERY SWOLLEN FROM RIGHT HAND IV
INFILTRATION. THE IV WAS DISCONTINUED DURING NOC SHIFT. RIGHT ARM ELEVATED ON
PILLOWS-WILL MONITOR.

## 2022-07-03 NOTE — NUR
PT REMAINS AGITATED. SHE IS STILL MOVING HER HEAD ABOUT THE PILLOW-DESPITE
PROPOFOL @ 50 MCG/KG/MIN AND VERSED @ 5 MG/HR. MED WITH ATIVAN 2 MG IVP X 1.
RT SUMMONED TO BEDSIDE AS SATS STILL 80'S. ETT RETAPED AND BITE BLOCK
ADJUSTED-26 @ TEETH. ONCE PT MEDICATED AND ETT REPOSITIONED, SATS 100%-FIO2
TITRATED BACK DOWN TO 40%. MAP TRENDING 70'S WITH EPI @ 10 MCG/MIN, LEVOPHED @
5 MCG/MIN, AND VASOPRESSIN @ 0.04 UNITS/MIN.

## 2022-07-03 NOTE — NUR
DR. LANZA GIVEN FULL UPDATE OVER THE PHONE. CURRENTLY, PT ON PROPOFOL @
35MCG/MIN, VERSED @ 3 MG/HR. PT IS COUGHING, AND GRIMACING. APPEARS TO BE
THRASHING HER HEAD ABOUT THE PILLOW. EPI @ 10 MCG/MIN, LEVOPHED @ 10 MCG/MIN,
AND VASOPRESSIN @ 0.04 UNITS/MIN-MAP 65-80. SATS DROPPED TO THE 80'S-ATTEMPTED
TO SUCTION, BUT MINIMAL SECRETIONS AT THIS TIME. FIO2 TITRATED UP TO 50%.

## 2022-07-03 NOTE — NUR
Pastoral care visitation conducted. Pt's family located in waiting area.
Initial rapport established and empathic presence extended. Family gathered
into the pt's room while prayer and a blessing were extended. Family were
appropriately tearful and proceeded to regroup in the waiting area. Assurance
of prospective support relayed.

## 2022-07-03 NOTE — NUR
2 ND  UNIT OF PRBC'S COMPLETE. KETAMINE DRIP INITIATED @ 0.2 MCG/KG/MIN AND
SLOWLY TITRATED UP TO 0.4 MCG/KG/MIN-PROPOFOL TITRATED DOWN TO 25 MCG/KG/MIN
AND VERSED @ 5 MG/HR.